# Patient Record
Sex: FEMALE | Employment: UNEMPLOYED
[De-identification: names, ages, dates, MRNs, and addresses within clinical notes are randomized per-mention and may not be internally consistent; named-entity substitution may affect disease eponyms.]

---
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
## 2020-01-06 ENCOUNTER — VIRTUAL VISIT (OUTPATIENT)
Dept: FAMILY MEDICINE | Facility: OTHER | Age: 20
End: 2020-01-06

## 2020-01-06 NOTE — PROGRESS NOTES
"Date: 2020 15:05:07  Clinician: Migue Ross  Clinician NPI: [id number]  Patient: Inge Laughlin  Patient : 2000  Patient Address: [address] [place] [address]  Patient Phone: [phone]  Visit Protocol: UTI  Patient Summary:  Inge is a 19 year old ( : 2000 ) female who initiated a Visit for a presumed bladder infection. When asked the question \"Please sign me up to receive news, health information and promotions. \", Inge responded \"No\".   Her symptoms started 1-3 days ago and consist of dysuria, foul-smelling urine, feeling as if the bladder is never empty, urinary frequency, urgency, and urinary incontinence.   Symptom details   Urine color: The color of her urine is yellow.    Denied symptoms include nausea, flank pain, abdominal pain, chills, vaginal discharge, vomiting, and vaginal itching. She does not feel feverish.   Inge has not used any over-the-counter medications or home remedies to relieve her current symptoms.  Precipitating events  Inge denies having a sexually transmitted disease.  Pertinent medical history  Inge has had a bladder infection before and has had 1 in the past 12 months. Her most recent bladder infection was not within the last 4 weeks. Her current symptoms are similar to her previous bladder infection symptoms.   She is not sure what antibiotics have been effective in treating her past bladder infections.   Inge has not been prescribed antibiotics to prevent frequent or repeated bladder infections in the past and does not get yeast infections when she takes antibiotics. She has not experienced problems or side effects with any of the common antibiotics used to treat bladder infections.   Inge does not have a history of kidney stones. She has not used a catheter or been a patient in a hospital or nursing home in the past 2 weeks.   Inge does not smoke or use smokeless tobacco.   She denies pregnancy and denies breastfeeding. She does not " menstruate.     MEDICATIONS: No current medications, ALLERGIES: NKDA  Clinician Response:  Dear Inge,  Based on the information you have provided, you likely have an acute urinary tract infection, also called a bladder infection. Bladder infections occur when bacteria from the outside of the body enters the urinary tract. Any part of the urinary system can be infected, but the bladder is the most common.  Medication information  I am prescribing:     Nitrofurantoin monohyd/m-cryst (Macrobid) 100 mg oral capsule. Take 1 capsule by mouth every 12 hours for 5 days. Take this medication with food. There are no refills with this prescription.   The medication I prescribed for your bladder infection is an antibiotic. Continue taking the medication until it is gone even if you feel better.   Yeast infections can be a common side effect of antibiotics. The most common symptom of a yeast infection is itchiness in and around the vagina. Other signs and symptoms include burning, redness, or a thick, white vaginal discharge that looks like cottage cheese and does not have a bad smell.  Self care  Urination helps to flush bacteria from the urinary tract. For this reason, drinking water and urinating often helps relieve some urinary symptoms and can decrease your risk of getting bladder infections in the future.  Other steps you can take to prevent future bladder infections include:     Wipe front to back after using the bathroom    Urinate after sexual intercourse    Avoid using deodorant sprays, douches, or powders in the vaginal area     When to seek care  Please make an appointment to be seen in a clinic or urgent care if any of the following occur:     You develop new symptoms or your symptoms become worse    You have medication side effects that make it difficult to take them as prescribed    Your symptoms do not improve within 1-2 days of starting treatment    You have symptoms of a bladder infection that return shortly  after completing treatment     It is possible to have an allergic reaction to an antibiotic even if you have not had one in the past. If you notice a new rash, significant swelling, or difficulty breathing, stop taking this medication immediately and go to a clinic or urgent care.   Diagnosis: Acute uncomplicated bladder infection  Diagnosis ICD: N39.0  Prescription: nitrofurantoin monohyd/m-cryst (Macrobid) 100 mg oral capsule 10 capsule, 5 days supply. Take 1 capsule by mouth every 12 hours for 5 days. Refills: 0, Refill as needed: no, Allow substitutions: yes  Pharmacy: [id number] - [phone] - [address] [place] [address]

## 2021-07-09 ENCOUNTER — OFFICE VISIT (OUTPATIENT)
Dept: FAMILY MEDICINE | Facility: CLINIC | Age: 21
End: 2021-07-09
Payer: COMMERCIAL

## 2021-07-09 VITALS
HEART RATE: 73 BPM | TEMPERATURE: 98 F | DIASTOLIC BLOOD PRESSURE: 66 MMHG | BODY MASS INDEX: 29.63 KG/M2 | WEIGHT: 161 LBS | HEIGHT: 62 IN | OXYGEN SATURATION: 98 % | SYSTOLIC BLOOD PRESSURE: 118 MMHG

## 2021-07-09 DIAGNOSIS — Z12.4 ENCOUNTER FOR SCREENING FOR CERVICAL CANCER: ICD-10-CM

## 2021-07-09 DIAGNOSIS — Z00.00 ROUTINE GENERAL MEDICAL EXAMINATION AT A HEALTH CARE FACILITY: Primary | ICD-10-CM

## 2021-07-09 DIAGNOSIS — Z11.3 SCREEN FOR STD (SEXUALLY TRANSMITTED DISEASE): ICD-10-CM

## 2021-07-09 PROCEDURE — 99385 PREV VISIT NEW AGE 18-39: CPT | Performed by: PHYSICIAN ASSISTANT

## 2021-07-09 PROCEDURE — 87591 N.GONORRHOEAE DNA AMP PROB: CPT | Performed by: PHYSICIAN ASSISTANT

## 2021-07-09 PROCEDURE — G0145 SCR C/V CYTO,THINLAYER,RESCR: HCPCS | Performed by: PHYSICIAN ASSISTANT

## 2021-07-09 PROCEDURE — 87491 CHLMYD TRACH DNA AMP PROBE: CPT | Performed by: PHYSICIAN ASSISTANT

## 2021-07-09 ASSESSMENT — MIFFLIN-ST. JEOR: SCORE: 1443.54

## 2021-07-09 ASSESSMENT — ENCOUNTER SYMPTOMS
EYE PAIN: 0
SHORTNESS OF BREATH: 0
PARESTHESIAS: 0
DYSURIA: 0
FREQUENCY: 0
DIARRHEA: 0
HEARTBURN: 0
NERVOUS/ANXIOUS: 0
COUGH: 0
CHILLS: 0
HEMATURIA: 0
HEADACHES: 0
MYALGIAS: 0
PALPITATIONS: 0
WEAKNESS: 0
DIZZINESS: 0
CONSTIPATION: 0
JOINT SWELLING: 0
HEMATOCHEZIA: 0
BREAST MASS: 0
ARTHRALGIAS: 0
SORE THROAT: 0
FEVER: 0
ABDOMINAL PAIN: 0
NAUSEA: 0

## 2021-07-09 ASSESSMENT — PAIN SCALES - GENERAL: PAINLEVEL: NO PAIN (0)

## 2021-07-09 NOTE — LETTER
July 12, 2021      Ingenirmala Laughlin  [address] [facility] [address]        Dear ,    We are writing to inform you of your test results, that were negative.     If you have any questions or concerns, please call the clinic at the number listed above.       Sincerely,      Yennifer Ang PA-C/sc

## 2021-07-09 NOTE — PROGRESS NOTES
SUBJECTIVE:   CC: Inge Laughlin is an 21 year old woman who presents for preventive health visit.       Patient has been advised of split billing requirements and indicates understanding: Yes    Answers for HPI/ROS submitted by the patient on 7/9/2021   Annual Exam:  Frequency of exercise:: None  Getting at least 3 servings of Calcium per day:: NO  Diet:: Regular (no restrictions)  Taking medications regularly:: Yes  Medication side effects:: Not applicable  Bi-annual eye exam:: Yes  Dental care twice a year:: NO  Sleep apnea or symptoms of sleep apnea:: None  abdominal pain: No  Blood in stool: No  Blood in urine: No  chest pain: Yes  chills: No  congestion: No  constipation: No  cough: No  diarrhea: No  dizziness: No  ear pain: No  eye pain: No  nervous/anxious: No  fever: No  frequency: No  genital sores: No  headaches: No  hearing loss: No  heartburn: No  arthralgias: No  joint swelling: No  peripheral edema: No  mood changes: No  myalgias: No  nausea: No  dysuria: No  palpitations: No  Skin sensation changes: No  sore throat: No  urgency: No  rash: No  shortness of breath: No  visual disturbance: No  weakness: No  pelvic pain: No  vaginal bleeding: No  vaginal discharge: No  tenderness: No  breast mass: No  breast discharge: No  Additional concerns today:: No        Today's PHQ-2 Score:   PHQ-2 ( 2000 [employer]) 7/9/2021   Q1: Little interest or pleasure in doing things 1   Q2: Feeling down, depressed or hopeless 0   PHQ-2 Score 1   Q1: Little interest or pleasure in doing things Several days   Q2: Feeling down, depressed or hopeless Not at all   PHQ-2 Score 1       Abuse: Current or Past(Physical, Sexual or Emotional)- No  Do you feel safe in your environment? Yes        Social History     Tobacco Use     Smoking status: Passive Smoke Exposure - Never Smoker     Tobacco comment: father   Substance Use Topics     Alcohol use: No     If you drink alcohol do you typically have >3 drinks per day or >7 drinks per  "week? No                     Reviewed orders with patient.  Reviewed health maintenance and updated orders accordingly - Yes  BP Readings from Last 3 Encounters:   07/09/21 118/66   10/01/09 109/71 (76 %, Z = 0.71 /  84 %, Z = 0.99)*   09/26/08 103/69 (67 %, Z = 0.45 /  80 %, Z = 0.85)*     *BP percentiles are based on the 2017 AAP Clinical Practice Guideline for girls    Wt Readings from Last 3 Encounters:   07/09/21 73 kg (161 lb)   12/20/12 58.4 kg (128 lb 12 oz) (81 %, Z= 0.88)*   10/01/09 44.7 kg (98 lb 9.6 oz) (87 %, Z= 1.11)*     * Growth percentiles are based on [facility] (Girls, 2-20 Years) data.                        Pertinent mammograms are reviewed under the imaging tab.  History of abnormal Pap smear: NO - age 21-29 PAP every 3 years recommended     Reviewed and updated as needed this visit by clinical staff   Allergies  Meds              Reviewed and updated as needed this visit by Provider                    ROS:  CONSTITUTIONAL: NEGATIVE for fever, chills, change in weight  INTEGUMENTARU/SKIN: NEGATIVE for worrisome rashes, moles or lesions  EYES: NEGATIVE for vision changes or irritation  ENT: NEGATIVE for ear, mouth and throat problems  RESP: NEGATIVE for significant cough or SOB  BREAST: NEGATIVE for masses, tenderness or discharge  CV: NEGATIVE for chest pain, palpitations or peripheral edema  GI: NEGATIVE for nausea, abdominal pain, heartburn, or change in bowel habits  : NEGATIVE for unusual urinary or vaginal symptoms. Periods are regular.  MUSCULOSKELETAL: NEGATIVE for significant arthralgias or myalgia  NEURO: NEGATIVE for weakness, dizziness or paresthesias  PSYCHIATRIC: NEGATIVE for changes in mood or affect    OBJECTIVE:   /66   Pulse 73   Temp 98  F (36.7  C) (Tympanic)   Ht 1.575 m (5' 2\")   Wt 73 kg (161 lb)   SpO2 98%   BMI 29.45 kg/m    EXAM:  GENERAL: healthy, alert and no distress  EYES: Eyes grossly normal to inspection, PERRL and conjunctivae and sclerae normal  HENT: " "ear canals and TM's normal, nose and mouth without ulcers or lesions  NECK: no adenopathy, no asymmetry, masses, or scars and thyroid normal to palpation  RESP: lungs clear to auscultation - no rales, rhonchi or wheezes  BREAST: normal without masses, tenderness or nipple discharge and no palpable axillary masses or adenopathy  CV: regular rate and rhythm, normal S1 S2, no S3 or S4, no murmur, click or rub, no peripheral edema and peripheral pulses strong  ABDOMEN: soft, nontender, no hepatosplenomegaly, no masses and bowel sounds normal   (female): normal female external genitalia, normal urethral meatus, vaginal mucosa pink, moist, well rugated, and normal cervix/adnexa/uterus without masses or discharge. IUD strings visible  MS: no gross musculoskeletal defects noted, no edema  SKIN: no suspicious lesions or rashes  NEURO: Normal strength and tone, mentation intact and speech normal  PSYCH: mentation appears normal, affect normal/bright    Diagnostic Test Results:  pending    ASSESSMENT/PLAN:   (Z00.00) Routine general medical examination at a health care facility  (primary encounter diagnosis)  Comment:   Plan: Pap imaged thin layer screen only - recommended        age 21 - 24 years, NEISSERIA GONORRHOEA PCR,         CHLAMYDIA TRACHOMATIS PCR            (Z11.3) Screen for STD (sexually transmitted disease)  Comment:   Plan: NEISSERIA GONORRHOEA PCR, CHLAMYDIA TRACHOMATIS        PCR            (Z12.4) Encounter for screening for cervical cancer   Comment:   Plan: Pap imaged thin layer screen only - recommended        age 21 - 24 years              Patient has been advised of split billing requirements and indicates understanding: Yes  COUNSELING:   Reviewed preventive health counseling, as reflected in patient instructions    Estimated body mass index is 29.45 kg/m  as calculated from the following:    Height as of this encounter: 1.575 m (5' 2\").    Weight as of this encounter: 73 kg (161 lb).        She " reports that she is a non-smoker but has been exposed to tobacco smoke. She does not have any smokeless tobacco history on file.      Counseling Resources:  ATP IV Guidelines  Pooled Cohorts Equation Calculator  Breast Cancer Risk Calculator  BRCA-Related Cancer Risk Assessment: FHS-7 Tool  FRAX Risk Assessment  ICSI Preventive Guidelines  Dietary Guidelines for Americans, 2010  USDA's MyPlate  ASA Prophylaxis  Lung CA Screening    VENKATESH Hammer [facility]

## 2021-07-10 LAB
C TRACH DNA SPEC QL NAA+PROBE: NEGATIVE
N GONORRHOEA DNA SPEC QL NAA+PROBE: NEGATIVE
SPECIMEN SOURCE: NORMAL
SPECIMEN SOURCE: NORMAL

## 2021-07-14 ENCOUNTER — TELEPHONE (OUTPATIENT)
Dept: FAMILY MEDICINE | Facility: CLINIC | Age: 21
End: 2021-07-14

## 2021-07-14 DIAGNOSIS — N64.4 BREAST PAIN: ICD-10-CM

## 2021-07-14 DIAGNOSIS — N64.52 NIPPLE DISCHARGE: Primary | ICD-10-CM

## 2021-07-14 LAB
COPATH REPORT: NORMAL
PAP: NORMAL

## 2021-07-14 NOTE — TELEPHONE ENCOUNTER
Reason for Call: Request for an order or referral:    Order or referral being requested: mammogram    Date needed: as soon as possible    Has the patient been seen by the PCP for this problem? YES    Additional comments: pt called to schedule  But no order in since under 40, stated it was discussed at last appt. Please call when order is placed to schedule     Phone number Patient can be reached at:  Home number on file [phone] (home)    Best Time:  Anytime     Can we leave a detailed message on this number?  YES    Call taken on 7/14/2021 at 3:55 PM by Pepe Winston

## 2021-07-15 NOTE — TELEPHONE ENCOUNTER
Call placed to patient  No answer; generic voicemail left requesting call back to Clinic RN at [phone]    Noman Staley RN

## 2021-07-15 NOTE — TELEPHONE ENCOUNTER
Sorry - I don't remember discussing this. I think she may have mentioned some occasional breast pain across the upper half of both breasts but it was more consistent with just the tissue/ligaments stretching/pulling so we discussed it was normal/not concerning, especially with her normal exam. She seemed comfortable with this at the end of the visit so if I am missing something, we will need to re discuss    Yennifer

## 2021-07-16 NOTE — TELEPHONE ENCOUNTER
Call placed to patient  Relayed Lorena's message    Patient verbalized understanding  No further questions/concerns    Noman Staley RN

## 2021-07-16 NOTE — TELEPHONE ENCOUNTER
"Call placed to patient   Relayed Lorena's message    Patient reports her bilateral breast pain has been worsening since her visit with Lorena  Rates pain a 7/10 on the pain scale  Pain is intermittent and patient describes the pain as \"uncomfortable\"  Denies pain radiating    Reports she has noticed 2 days after her appointment with Lorena that she was having red/pink tinged drainage from her nipple - this has been occurring daily  Reports she had a nipple piercing previously and had drainage and she had removed piercing 2 months later with drainage stopping up until recently     Patient denies redness / warmth to breasts  Reports irritation to bilateral nipples  Denies open areas    Appointment scheduled with Lorena for 7/20/2021 at 0800    Patient continues to request [facility] place order for Mammogram as she will be moving in early August and would like to have breasts assessed prior to moving    Will route to Lorena to review    Noman Staley RN    "

## 2021-07-20 ENCOUNTER — OFFICE VISIT (OUTPATIENT)
Dept: FAMILY MEDICINE | Facility: CLINIC | Age: 21
End: 2021-07-20
Payer: COMMERCIAL

## 2021-07-20 VITALS
DIASTOLIC BLOOD PRESSURE: 64 MMHG | OXYGEN SATURATION: 98 % | HEART RATE: 75 BPM | TEMPERATURE: 98.2 F | WEIGHT: 164 LBS | BODY MASS INDEX: 30.18 KG/M2 | SYSTOLIC BLOOD PRESSURE: 116 MMHG | HEIGHT: 62 IN

## 2021-07-20 DIAGNOSIS — N64.3 GALACTORRHEA: Primary | ICD-10-CM

## 2021-07-20 PROCEDURE — 99213 OFFICE O/P EST LOW 20 MIN: CPT | Performed by: PHYSICIAN ASSISTANT

## 2021-07-20 ASSESSMENT — PAIN SCALES - GENERAL: PAINLEVEL: NO PAIN (0)

## 2021-07-20 ASSESSMENT — MIFFLIN-ST. JEOR: SCORE: 1457.15

## 2021-07-20 NOTE — PROGRESS NOTES
"    Assessment & Plan     (N64.3) Galactorrhea  (primary encounter diagnosis)  Comment: lab orders placed for future as patient unable to have them drawn today. Has mammo/US scheduled already. Discussed very likely benign nature of symptoms and normal breast exam again today.   Plan: Prolactin, Basic metabolic panel  (Ca, Cl, CO2,        Creat, Gluc, K, Na, BUN), TSH with free T4         reflex, HCG Quantitative Pregnancy, Blood         ([id number])            BMI:   Estimated body mass index is 30 kg/m  as calculated from the following:    Height as of this encounter: 1.575 m (5' 2\").    Weight as of this encounter: 74.4 kg (164 lb).     Return in about 2 weeks (around 8/3/2021) for lab and imaging results.    Yennifer Ang PA-C  [facility] ZOLTAN Alcazar is a 21 year old who presents for the following health issues     HPI       Patient is here today to follow up with breast discharge and pain, see triage note form 7/14/21. She says it has been staying the same over the last few days. She is having some anxiety with this. She does have a mammogram and breast ultrasound scheduled for 7/26/21.     Had a physical recently - normal breast exam at that time  Mentioned some occasional discomfort in her breasts at that time but nothing about discharge  States she had noted the discharge before that visit but then it seemed to resolve. Started again after the visit    Can be both breasts although has noticed it more on the left  Will notice crusting at the nipple or sometimes the bra/shirt will be wet from where it has leaked  Thinks it might be pink or blood tinged at times    Pain is migratory - can be at random times all over her breasts       Review of Systems   Constitutional, HEENT, cardiovascular, pulmonary, gi and gu systems are negative, except as otherwise noted.      Objective    /64   Pulse 75   Temp 98.2  F (36.8  C) (Tympanic)   Ht 1.575 m (5' 2\")   Wt 74.4 kg (164 lb)   " SpO2 98%   BMI 30.00 kg/m    Body mass index is 30 kg/m .  Physical Exam   GENERAL: healthy, alert and no distress  BREAST: normal without masses, tenderness or nipple discharge and no palpable axillary masses or adenopathy    Diagnostic Tests:  Labs ordered to be collected at a later date  Mammo/US scheduled for 7/26

## 2021-07-26 ENCOUNTER — HOSPITAL ENCOUNTER (OUTPATIENT)
Dept: ULTRASOUND IMAGING | Facility: CLINIC | Age: 21
End: 2021-07-26
Attending: PHYSICIAN ASSISTANT
Payer: COMMERCIAL

## 2021-07-26 DIAGNOSIS — N64.52 NIPPLE DISCHARGE: ICD-10-CM

## 2021-07-26 DIAGNOSIS — N64.4 BREAST PAIN: ICD-10-CM

## 2021-07-26 PROCEDURE — 76642 ULTRASOUND BREAST LIMITED: CPT | Mod: 50

## 2022-02-28 ENCOUNTER — OFFICE VISIT (OUTPATIENT)
Dept: FAMILY MEDICINE | Facility: CLINIC | Age: 22
End: 2022-02-28
Payer: COMMERCIAL

## 2022-02-28 VITALS
RESPIRATION RATE: 15 BRPM | HEIGHT: 62 IN | DIASTOLIC BLOOD PRESSURE: 81 MMHG | OXYGEN SATURATION: 98 % | WEIGHT: 173.3 LBS | TEMPERATURE: 99.8 F | SYSTOLIC BLOOD PRESSURE: 128 MMHG | BODY MASS INDEX: 31.89 KG/M2 | HEART RATE: 76 BPM

## 2022-02-28 DIAGNOSIS — K21.00 GASTROESOPHAGEAL REFLUX DISEASE WITH ESOPHAGITIS WITHOUT HEMORRHAGE: Primary | ICD-10-CM

## 2022-02-28 PROCEDURE — 99213 OFFICE O/P EST LOW 20 MIN: CPT | Performed by: FAMILY MEDICINE

## 2022-02-28 NOTE — PATIENT INSTRUCTIONS
Omeprazole  20 mg daily for 4 weeks.      Then tums as needed.     1)Patient Education: eat smaller, more frequent meals, last meal at least 3 hours before bedtime, avoid chocolate, citrus, alcohol, caffeine, and peppermint, no more than 30% of calories from fat, if obese, lose weight and elevate head of bed       If it keeps coming back please call.

## 2022-02-28 NOTE — PROGRESS NOTES
"  Assessment & Plan     Gastroesophageal reflux disease with esophagitis without hemorrhage    - omeprazole (PRILOSEC) 20 MG DR capsule; Take 1 capsule (20 mg) by mouth daily       Tobacco Cessation:   reports that she has been smoking. She uses smokeless tobacco.  Tobacco Cessation Action Plan: Information offered: Patient not interested at this time    BMI:   Estimated body mass index is 31.89 kg/m  as calculated from the following:    Height as of this encounter: 1.57 m (5' 1.81\").    Weight as of this encounter: 78.6 kg (173 lb 4.8 oz).   Weight management plan: Discussed healthy diet and exercise guidelines      [facility] ZOLTAN Alcazar is a 21 year old who presents for the following health issues    History of Present Illness     Reason for visit:  Acid reflux  Symptom onset:  More than a month    She eats 0-1 servings of fruits and vegetables daily.She consumes 0 sweetened beverage(s) daily.She exercises with enough effort to increase her heart rate 9 or less minutes per day.  She exercises with enough effort to increase her heart rate 3 or less days per week.   She is taking medications regularly.     *  Has been going on over the last year, she has not tried anything over the counter for the acid reflux      Review of Systems   Constitutional, HEENT, cardiovascular, pulmonary, gi and gu systems are negative, except as otherwise noted.      Objective    /81   Pulse 76   Temp 99.8  F (37.7  C) (Tympanic)   Resp 15   Ht 1.57 m (5' 1.81\")   Wt 78.6 kg (173 lb 4.8 oz)   SpO2 98%   BMI 31.89 kg/m    Body mass index is 31.89 kg/m .  Physical Exam   GENERAL: healthy, alert and no distress  NECK: no adenopathy, no asymmetry, masses, or scars and thyroid normal to palpation  RESP: lungs clear to auscultation - no rales, rhonchi or wheezes  CV: regular rate and rhythm, normal S1 S2, no S3 or S4, no murmur, click or rub, no peripheral edema and peripheral pulses strong  ABDOMEN: " soft, nontender, no hepatosplenomegaly, no masses and bowel sounds normal  MS: no gross musculoskeletal defects noted, no edema

## 2022-08-27 ENCOUNTER — HEALTH MAINTENANCE LETTER (OUTPATIENT)
Age: 22
End: 2022-08-27

## 2022-12-26 ENCOUNTER — HEALTH MAINTENANCE LETTER (OUTPATIENT)
Age: 22
End: 2022-12-26

## 2023-09-23 ENCOUNTER — HEALTH MAINTENANCE LETTER (OUTPATIENT)
Age: 23
End: 2023-09-23

## 2024-11-10 ENCOUNTER — HEALTH MAINTENANCE LETTER (OUTPATIENT)
Age: 24
End: 2024-11-10